# Patient Record
Sex: FEMALE | Race: WHITE | Employment: UNEMPLOYED | URBAN - METROPOLITAN AREA
[De-identification: names, ages, dates, MRNs, and addresses within clinical notes are randomized per-mention and may not be internally consistent; named-entity substitution may affect disease eponyms.]

---

## 2017-08-19 PROBLEM — D72.829 LEUKOCYTOSIS: Status: ACTIVE | Noted: 2017-08-19

## 2017-08-19 PROBLEM — I48.91 ATRIAL FIBRILLATION WITH RVR (HCC): Status: ACTIVE | Noted: 2017-08-19

## 2017-08-20 PROBLEM — E43 SEVERE MALNUTRITION (HCC): Chronic | Status: ACTIVE | Noted: 2017-08-20

## 2017-08-21 PROBLEM — D72.829 LEUKOCYTOSIS: Status: RESOLVED | Noted: 2017-08-19 | Resolved: 2017-08-21

## 2017-08-21 PROBLEM — F03.90 DEMENTIA (HCC): Status: ACTIVE | Noted: 2017-08-21

## 2017-08-21 PROBLEM — R29.6 FREQUENT FALLS: Status: ACTIVE | Noted: 2017-08-21

## 2018-02-16 PROBLEM — J10.1 INFLUENZA B: Status: ACTIVE | Noted: 2018-02-16

## 2018-02-19 PROBLEM — R74.01 ELEVATED TRANSAMINASE MEASUREMENT: Status: ACTIVE | Noted: 2018-02-19

## 2018-03-20 ENCOUNTER — HOSPITAL ENCOUNTER (EMERGENCY)
Age: 83
Discharge: NURSING, REHAB OR LONG-TERM CARE | End: 2018-03-20
Attending: EMERGENCY MEDICINE
Payer: MEDICARE

## 2018-03-20 ENCOUNTER — APPOINTMENT (OUTPATIENT)
Dept: GENERAL RADIOLOGY | Age: 83
End: 2018-03-20
Payer: MEDICARE

## 2018-03-20 VITALS
BODY MASS INDEX: 17.68 KG/M2 | TEMPERATURE: 97 F | HEIGHT: 66 IN | DIASTOLIC BLOOD PRESSURE: 84 MMHG | SYSTOLIC BLOOD PRESSURE: 175 MMHG | OXYGEN SATURATION: 99 % | WEIGHT: 110 LBS | RESPIRATION RATE: 14 BRPM | HEART RATE: 100 BPM

## 2018-03-20 DIAGNOSIS — I48.20 CHRONIC ATRIAL FIBRILLATION (HCC): ICD-10-CM

## 2018-03-20 DIAGNOSIS — Z66 DNR (DO NOT RESUSCITATE): ICD-10-CM

## 2018-03-20 DIAGNOSIS — K62.5 RECTAL BLEEDING: Primary | ICD-10-CM

## 2018-03-20 LAB
ABO/RH: NORMAL
ABO/RH: NORMAL
ALBUMIN SERPL-MCNC: 2.9 G/DL (ref 3.5–5.2)
ALP BLD-CCNC: 125 U/L (ref 35–104)
ALT SERPL-CCNC: 26 U/L (ref 0–32)
ANION GAP SERPL CALCULATED.3IONS-SCNC: 11 MMOL/L (ref 7–16)
ANTIBODY SCREEN: NORMAL
APTT: 25.9 SEC (ref 24.5–35.1)
AST SERPL-CCNC: 19 U/L (ref 0–31)
BASOPHILS ABSOLUTE: 0.03 E9/L (ref 0–0.2)
BASOPHILS RELATIVE PERCENT: 0.4 % (ref 0–2)
BILIRUB SERPL-MCNC: 0.6 MG/DL (ref 0–1.2)
BUN BLDV-MCNC: 30 MG/DL (ref 8–23)
CALCIUM SERPL-MCNC: 7.9 MG/DL (ref 8.6–10.2)
CHLORIDE BLD-SCNC: 103 MMOL/L (ref 98–107)
CO2: 20 MMOL/L (ref 22–29)
CREAT SERPL-MCNC: 0.6 MG/DL (ref 0.5–1)
EOSINOPHILS ABSOLUTE: 0.01 E9/L (ref 0.05–0.5)
EOSINOPHILS RELATIVE PERCENT: 0.1 % (ref 0–6)
GFR AFRICAN AMERICAN: >60
GFR NON-AFRICAN AMERICAN: >60 ML/MIN/1.73
GLUCOSE BLD-MCNC: 117 MG/DL (ref 74–109)
HCT VFR BLD CALC: 25.6 % (ref 34–48)
HEMOGLOBIN: 8.7 G/DL (ref 11.5–15.5)
IMMATURE GRANULOCYTES #: 0.08 E9/L
IMMATURE GRANULOCYTES %: 1 % (ref 0–5)
INR BLD: 1.1
LACTIC ACID: 1.9 MMOL/L (ref 0.5–2.2)
LIPASE: 13 U/L (ref 13–60)
LYMPHOCYTES ABSOLUTE: 1.2 E9/L (ref 1.5–4)
LYMPHOCYTES RELATIVE PERCENT: 14.5 % (ref 20–42)
MCH RBC QN AUTO: 32.6 PG (ref 26–35)
MCHC RBC AUTO-ENTMCNC: 34 % (ref 32–34.5)
MCV RBC AUTO: 95.9 FL (ref 80–99.9)
MONOCYTES ABSOLUTE: 0.67 E9/L (ref 0.1–0.95)
MONOCYTES RELATIVE PERCENT: 8.1 % (ref 2–12)
NEUTROPHILS ABSOLUTE: 6.28 E9/L (ref 1.8–7.3)
NEUTROPHILS RELATIVE PERCENT: 75.9 % (ref 43–80)
PDW BLD-RTO: 17.9 FL (ref 11.5–15)
PLATELET # BLD: 232 E9/L (ref 130–450)
PMV BLD AUTO: 10.3 FL (ref 7–12)
POTASSIUM SERPL-SCNC: 4.4 MMOL/L (ref 3.5–5)
PROTHROMBIN TIME: 12.8 SEC (ref 9.3–12.4)
RBC # BLD: 2.67 E12/L (ref 3.5–5.5)
SODIUM BLD-SCNC: 134 MMOL/L (ref 132–146)
TOTAL PROTEIN: 4.7 G/DL (ref 6.4–8.3)
TROPONIN: 0.03 NG/ML (ref 0–0.03)
WBC # BLD: 8.3 E9/L (ref 4.5–11.5)

## 2018-03-20 PROCEDURE — 93005 ELECTROCARDIOGRAM TRACING: CPT | Performed by: EMERGENCY MEDICINE

## 2018-03-20 PROCEDURE — 96366 THER/PROPH/DIAG IV INF ADDON: CPT

## 2018-03-20 PROCEDURE — 80053 COMPREHEN METABOLIC PANEL: CPT

## 2018-03-20 PROCEDURE — 99284 EMERGENCY DEPT VISIT MOD MDM: CPT

## 2018-03-20 PROCEDURE — 86900 BLOOD TYPING SEROLOGIC ABO: CPT

## 2018-03-20 PROCEDURE — 86901 BLOOD TYPING SEROLOGIC RH(D): CPT

## 2018-03-20 PROCEDURE — 83605 ASSAY OF LACTIC ACID: CPT

## 2018-03-20 PROCEDURE — 85610 PROTHROMBIN TIME: CPT

## 2018-03-20 PROCEDURE — 85025 COMPLETE CBC W/AUTO DIFF WBC: CPT

## 2018-03-20 PROCEDURE — 86850 RBC ANTIBODY SCREEN: CPT

## 2018-03-20 PROCEDURE — 2580000003 HC RX 258: Performed by: EMERGENCY MEDICINE

## 2018-03-20 PROCEDURE — 96365 THER/PROPH/DIAG IV INF INIT: CPT

## 2018-03-20 PROCEDURE — 96376 TX/PRO/DX INJ SAME DRUG ADON: CPT

## 2018-03-20 PROCEDURE — 85730 THROMBOPLASTIN TIME PARTIAL: CPT

## 2018-03-20 PROCEDURE — 2500000003 HC RX 250 WO HCPCS: Performed by: EMERGENCY MEDICINE

## 2018-03-20 PROCEDURE — 84484 ASSAY OF TROPONIN QUANT: CPT

## 2018-03-20 PROCEDURE — 83690 ASSAY OF LIPASE: CPT

## 2018-03-20 PROCEDURE — 71045 X-RAY EXAM CHEST 1 VIEW: CPT

## 2018-03-20 RX ORDER — SODIUM CHLORIDE 9 MG/ML
500 INJECTION, SOLUTION INTRAVENOUS CONTINUOUS
Status: DISCONTINUED | OUTPATIENT
Start: 2018-03-20 | End: 2018-03-20 | Stop reason: HOSPADM

## 2018-03-20 RX ORDER — DILTIAZEM HYDROCHLORIDE 5 MG/ML
5 INJECTION INTRAVENOUS ONCE
Status: DISCONTINUED | OUTPATIENT
Start: 2018-03-20 | End: 2018-03-20 | Stop reason: SDUPTHER

## 2018-03-20 RX ORDER — QUETIAPINE FUMARATE 25 MG/1
12.5 TABLET, FILM COATED ORAL DAILY
COMMUNITY
End: 2018-03-20

## 2018-03-20 RX ORDER — DILTIAZEM HYDROCHLORIDE 5 MG/ML
5 INJECTION INTRAVENOUS ONCE
Status: COMPLETED | OUTPATIENT
Start: 2018-03-20 | End: 2018-03-20

## 2018-03-20 RX ORDER — ACETAMINOPHEN 325 MG/1
650 TABLET ORAL EVERY 4 HOURS PRN
COMMUNITY

## 2018-03-20 RX ADMIN — DILTIAZEM HYDROCHLORIDE 5 MG: 5 INJECTION INTRAVENOUS at 09:09

## 2018-03-20 RX ADMIN — SODIUM CHLORIDE 500 ML: 9 INJECTION, SOLUTION INTRAVENOUS at 09:06

## 2018-03-20 RX ADMIN — DILTIAZEM HYDROCHLORIDE 5 MG/HR: 5 INJECTION INTRAVENOUS at 09:11

## 2018-03-20 NOTE — ED NOTES
Talked with social work, patient can not go back to facility. They will let us know when we are able to send patient and where to send patient. Nurse taking care of patient made aware.             Lauren Garcia RN  03/20/18 1921

## 2018-03-20 NOTE — CARE COORDINATION
Social Work/Transition of Care:    SW spoke with pt GAGANDEEP Vo concerning Hospice Services when pt returns to the [facility], Per Gilda she is not ready for [employer] stating the pt was fine a week ago. BRAYDON explained transportation is not covered Gilda is agreeable to transport but will need assistance placing pt in the car.   Electronically signed by Attila Rae on 2/31/7584 at 12:52 PM

## 2018-03-20 NOTE — ED NOTES
Report given to Richi Mathew at [facility].       Jose Akhtar RN  03/20/18 8418 no difficulty in swallowing

## 2018-03-20 NOTE — ED PROVIDER NOTES
Followed by   diltiazem 100 mg in dextrose 5 % 100 mL infusion (0 mg/hr Intravenous Stopped 3/20/18 1047)       Medical Decision Making:   Differential Diagnoses:  Atrial Fibrillation, Atrial Flutter, SVT, Junctional Tachycardia, Anemia, Dehydration, Metabolic/Electrolyte Disorder, to name a few. Counseling: The emergency provider has spoken with the patient's POA and discussed todays results. Pt's POA feels that the patient would not want any aggressive resuscitative measures at all. In fact, the POA wants the patient to be made [address] Only. She does not want the patient to have repeat blood work, testing nor monitoring as she states, \"Estephania would not want this at all in light of her current age. \"   A DNRCC (Comfort Care Only) form     --------------------------------- IMPRESSION AND DISPOSITION ---------------------------------    IMPRESSION  1. Rectal bleeding    2. DNR (do not resuscitate)    3. Chronic atrial fibrillation ([facility] [address]        DISPOSITION  Disposition: Discharge to nursing home  Patient condition is stable    3/20/18, 8:13 AM.    This note is prepared by Anna Ortiz, acting as Scribe for Theresa Duran MD.    Theresa Duran MD:  The scribe's documentation has been prepared under my direction and personally reviewed by me in its entirety. I confirm that the note above accurately reflects all work, treatment, procedures, and medical decision making performed by me.            Theresa Duran MD  03/20/18 5683

## 2018-03-20 NOTE — ED NOTES
RN, with patient's family member, got patient dressed in bathroom and took her out to car. ECA and RN helped get pt into car. Catherine Zuñiga RN  03/20/18 2528

## 2018-03-20 NOTE — CARE COORDINATION
Case management/Transition of care    [facility] [phone] along with Dr. Murali Preston made patient DNR CC. Patient resides in an assisted living-[facility] [place]. Message left on Uma's voicemail and awaiting a call back regarding a plan moving forward. Spoke with head nurse Landy Sanchez at [facility] who states that they are not comfortable with having the patient return without a plan going forward including possibly hospice. 1258 Received call from [place], [place] who has decided that she would like hospice involved at [facility]. She would like to be present when they come to see patient initially. Referral will be made to [facility]. 1400 Referral made to Nirmal Mason at [facility]. Updated Landy Sanchez at [facility]. POA present at this time to transport patient back to [facility].        Electronically signed by Pedro Pbalo Goldsmith RN on 3/20/2018 at 12:17 PM

## 2018-03-21 LAB
EKG ATRIAL RATE: 166 BPM
EKG Q-T INTERVAL: 254 MS
EKG QRS DURATION: 82 MS
EKG QTC CALCULATION (BAZETT): 387 MS
EKG R AXIS: 62 DEGREES
EKG T AXIS: -113 DEGREES
EKG VENTRICULAR RATE: 140 BPM